# Patient Record
Sex: MALE | Race: WHITE | NOT HISPANIC OR LATINO | URBAN - METROPOLITAN AREA
[De-identification: names, ages, dates, MRNs, and addresses within clinical notes are randomized per-mention and may not be internally consistent; named-entity substitution may affect disease eponyms.]

---

## 2021-04-15 ENCOUNTER — OFFICE (OUTPATIENT)
Dept: URBAN - METROPOLITAN AREA CLINIC 106 | Facility: CLINIC | Age: 76
End: 2021-04-15

## 2021-04-15 VITALS
TEMPERATURE: 97.1 F | HEART RATE: 68 BPM | HEIGHT: 74 IN | WEIGHT: 233 LBS | SYSTOLIC BLOOD PRESSURE: 130 MMHG | DIASTOLIC BLOOD PRESSURE: 84 MMHG

## 2021-04-15 DIAGNOSIS — R10.9 UNSPECIFIED ABDOMINAL PAIN: ICD-10-CM

## 2021-04-15 PROCEDURE — 99214 OFFICE O/P EST MOD 30 MIN: CPT | Performed by: INTERNAL MEDICINE

## 2021-04-15 NOTE — SERVICENOTES
I will get a copy of his blood work and CT scan report from his ER visit to review. In the meantime, I encouraged him to try the Dicyclomine if he has any further episodes of right sided abdominal cramping to see if it helps. For now, I will have him return as needed or sooner if review of his recent ER records warrants follow-up.

## 2021-04-15 NOTE — SERVICEHPINOTES
The patient is seen today for follow-up regarding chronic GERD and a personal history of colon polyps.His most recent surveillance colonoscopy was done on 4/9/19 and was remarkable for two small polyps (tubular adenoma and sessile serrated adenoma), diverticulosis and mild internal hemorrhoids. A repeat surveillance exam was not recommended due to his age.BRAn EGD done at the same time (due to chronic GERD) was remarkable for an irregular Z-line at the GE junction (no barretts on biopsy), small hiatal hernia and gastric polyps (benign fundic gland polyps on biopsy).Today,   he returns (accompanied by his wife) after a recent ER visit - he reports that he was seen in the [facility] ER about 4 weeks ago secondary to right sided abdominal discomfort. Per their report, his evaluation (blood work, CT scan) was unremarkable and he was discharged home with prescriptions for Dicyclomine and Zofran (but has not used the Dicyclomine as of yet).Jd denies any GI tract bleeding symptoms, emesis, unexplained weight loss or changes in his usual bowel habits - he reports that his appetite has been good.

## 2021-08-09 ENCOUNTER — OFFICE (OUTPATIENT)
Dept: URBAN - METROPOLITAN AREA CLINIC 84 | Facility: CLINIC | Age: 76
End: 2021-08-09
Payer: COMMERCIAL

## 2021-08-09 VITALS
HEART RATE: 68 BPM | HEIGHT: 74 IN | WEIGHT: 235 LBS | RESPIRATION RATE: 14 BRPM | DIASTOLIC BLOOD PRESSURE: 92 MMHG | SYSTOLIC BLOOD PRESSURE: 140 MMHG

## 2021-08-09 DIAGNOSIS — R14.0 ABDOMINAL DISTENSION (GASEOUS): ICD-10-CM

## 2021-08-09 DIAGNOSIS — K58.2 MIXED IRRITABLE BOWEL SYNDROME: ICD-10-CM

## 2021-08-09 DIAGNOSIS — R10.9 UNSPECIFIED ABDOMINAL PAIN: ICD-10-CM

## 2021-08-09 PROCEDURE — 99214 OFFICE O/P EST MOD 30 MIN: CPT | Performed by: INTERNAL MEDICINE

## 2021-08-09 RX ORDER — DICYCLOMINE HYDROCHLORIDE 10 MG/1
CAPSULE ORAL
Qty: 30 | Refills: 2 | Status: ACTIVE
Start: 2021-08-09

## 2021-08-09 NOTE — SERVICEHPINOTES
The patient is seen today for follow-up regarding right sided abdominal discomfort, chronic GERD and a personal history of colon polyps.His most recent surveillance colonoscopy was done on 4/9/19 and was remarkable for two small polyps (tubular adenoma and sessile serrated adenoma), diverticulosis and mild internal hemorrhoids. A repeat surveillance exam was not recommended due to his age.brAn EGD done at the same time (due to chronic GERD) was remarkable for an irregular Z-line at the GE junction (no barretts on biopsy), small hiatal hernia and gastric polyps (benign fundic gland polyps on biopsy).His blood work done at the time of a [place] ER visit on 3/23/21 demonstrated a normal wbc count, hgb 16.7, normal liver enzymes and lipase 41. A contrasted CT of the abd/pelvis was only remarkable for a small hepatic cyst and degenerative changes of the spine.At the time of his last office visit in 4/2021, I placed him on a trial of Dicyclomine and today that he continues to have episodes of mid abdominal cramping with associated bloating and irregular bowel habits - he denies any GI tract bleeding symptoms or unexplained weight loss and states that his appetite has been good. He feels that the Bentyl had helped with his abdominal cramping.

## 2021-11-23 ENCOUNTER — OFFICE (OUTPATIENT)
Dept: URBAN - METROPOLITAN AREA CLINIC 67 | Facility: CLINIC | Age: 76
End: 2021-11-23
Payer: COMMERCIAL

## 2021-11-23 VITALS
HEART RATE: 65 BPM | DIASTOLIC BLOOD PRESSURE: 76 MMHG | TEMPERATURE: 96.9 F | WEIGHT: 240 LBS | RESPIRATION RATE: 12 BRPM | HEIGHT: 74 IN | SYSTOLIC BLOOD PRESSURE: 128 MMHG

## 2021-11-23 DIAGNOSIS — R14.0 ABDOMINAL DISTENSION (GASEOUS): ICD-10-CM

## 2021-11-23 DIAGNOSIS — R10.9 UNSPECIFIED ABDOMINAL PAIN: ICD-10-CM

## 2021-11-23 DIAGNOSIS — K21.9 GASTRO-ESOPHAGEAL REFLUX DISEASE WITHOUT ESOPHAGITIS: ICD-10-CM

## 2021-11-23 DIAGNOSIS — E74.31 SUCRASE-ISOMALTASE DEFICIENCY: ICD-10-CM

## 2021-11-23 PROCEDURE — 99214 OFFICE O/P EST MOD 30 MIN: CPT | Performed by: INTERNAL MEDICINE

## 2021-11-23 NOTE — SERVICEHPINOTES
The patient is seen today for follow-up regarding right sided abdominal discomfort, chronic GERD and a personal history of colon polyps.His most recent surveillance colonoscopy was done on 4/9/19 and was remarkable for two small polyps (tubular adenoma and sessile serrated adenoma), diverticulosis and mild internal hemorrhoids. A repeat surveillance exam was not recommended due to his age.brAn EGD done at the same time (due to chronic GERD) was remarkable for an irregular Z-line at the GE junction (no barretts on biopsy), small hiatal hernia and gastric polyps (benign fundic gland polyps on biopsy).Blood work done at the time of a [place] ER visit on 3/23/21 demonstrated a normal wbc count, hgb 16.7, normal liver enzymes and lipase 41. A contrasted CT of the abd/pelvis was only remarkable for a small hepatic cyst and degenerative changes of the spine.He has had some improvement in his symptoms with a trial of Bentyl. brAt the time of his last appointment on 8/9/21, his tTG IgA antibody titer was normal/negative - a fecal elastase was normal but at the lower limit of normal so I decided to put him on a trial of Zenpep 15,000 (2 with meals 1 with snacks) as his symptoms may be related to underlying EPI.brIn addition, a Sucraid BT demonstrated low sucrase activity so I also placed him on a trial of Sucraid. Today, he reports that he stopped the Zenpep as he felt that it didn't help with his symptoms. He has been taking the Sucraid usually twice daily and feels that his symptoms are much improved - he still uses the Bentyl as needed but the frequency is much less.

## 2021-11-23 NOTE — SERVICENOTES
He has been doing very well on the Sucraid - I will have him finish out his current Rx and then have him see how he does without it. 
For now, I will have him return for follow-up in 6 months or sooner if needed.

## 2022-02-15 ENCOUNTER — OFFICE (OUTPATIENT)
Dept: URBAN - METROPOLITAN AREA CLINIC 67 | Facility: CLINIC | Age: 77
End: 2022-02-15
Payer: COMMERCIAL

## 2022-02-15 VITALS
DIASTOLIC BLOOD PRESSURE: 83 MMHG | HEIGHT: 74 IN | WEIGHT: 240 LBS | SYSTOLIC BLOOD PRESSURE: 138 MMHG | HEART RATE: 72 BPM | TEMPERATURE: 97.7 F

## 2022-02-15 DIAGNOSIS — K21.9 GASTRO-ESOPHAGEAL REFLUX DISEASE WITHOUT ESOPHAGITIS: ICD-10-CM

## 2022-02-15 DIAGNOSIS — R14.0 ABDOMINAL DISTENSION (GASEOUS): ICD-10-CM

## 2022-02-15 PROCEDURE — 99214 OFFICE O/P EST MOD 30 MIN: CPT | Performed by: INTERNAL MEDICINE

## 2022-02-15 NOTE — SERVICENOTES
Discussed with him that the bulge in his upper abdomen is consistent with an acquired rectus abdominis diastasis which is not a true hernia and therefore does not necessarily need repair. We discussed that first-line treatment involves exercise and weight loss. 
For now, I will have him return to see me in 6 months or sooner if needed.

## 2022-02-15 NOTE — SERVICEHPINOTES
Alphonso Moon   is seen today for a follow-up visit regarding right sided abdominal discomfort, chronic GERD and a personal history of colon polyps.His most recent surveillance colonoscopy was done on 4/9/19 and was remarkable for two small polyps (tubular adenoma and sessile serrated adenoma), diverticulosis and mild internal hemorrhoids. A repeat surveillance exam was not recommended due to his age.brAn EGD done at the same time (due to chronic GERD) was remarkable for an irregular Z-line at the GE junction (no barretts on biopsy), small hiatal hernia and gastric polyps (benign fundic gland polyps on biopsy).Blood work done at the time of a [place] ER visit on 3/23/21 demonstrated a normal wbc count, hgb 16.7, normal liver enzymes and lipase 41. A contrasted CT of the abd/pelvis was only remarkable for a small hepatic cyst and degenerative changes of the spine.He has had some improvement in his symptoms with a trial of Bentyl.brAt the time of his last appointment on 8/9/21, his tTG IgA antibody titer was normal/negative. brA fecal elastase was normal but at the lower limit of normal - I placed him on a trial of Zenpep 15,000 (2 with meals 1 with snacks) but he felt it did not help with his symptoms. brHowever, a Sucraid BT demonstrated low sucrase activity - he was placed on a trial of Sucraid which has helped with his symptoms. Today, he reports that he continues to take the Sucraid twice daily and really has not had much need to take the Dicyclomine. He recently noticed a bulge in his upper abdomen but without any associated pain, emesis or fever - he was concerned about a possible hernia which is why he comes in today.br

## 2022-07-18 ENCOUNTER — OFFICE (OUTPATIENT)
Dept: URBAN - METROPOLITAN AREA CLINIC 67 | Facility: CLINIC | Age: 77
End: 2022-07-18
Payer: COMMERCIAL

## 2022-07-18 VITALS
WEIGHT: 227 LBS | SYSTOLIC BLOOD PRESSURE: 116 MMHG | DIASTOLIC BLOOD PRESSURE: 78 MMHG | OXYGEN SATURATION: 98 % | HEIGHT: 74 IN | HEART RATE: 55 BPM

## 2022-07-18 DIAGNOSIS — K58.9 IRRITABLE BOWEL SYNDROME WITHOUT DIARRHEA: ICD-10-CM

## 2022-07-18 DIAGNOSIS — K21.9 GASTRO-ESOPHAGEAL REFLUX DISEASE WITHOUT ESOPHAGITIS: ICD-10-CM

## 2022-07-18 DIAGNOSIS — E74.31 SUCRASE-ISOMALTASE DEFICIENCY: ICD-10-CM

## 2022-07-18 PROCEDURE — 99214 OFFICE O/P EST MOD 30 MIN: CPT | Performed by: INTERNAL MEDICINE

## 2022-07-18 NOTE — SERVICEHPINOTES
Alphonso Moon   is seen today for a follow-up visit regarding right sided abdominal discomfort, chronic GERD and a personal history of colon polyps.His most recent surveillance colonoscopy was done on 4/9/19 and was remarkable for two small polyps (tubular adenoma and sessile serrated adenoma), diverticulosis and mild internal hemorrhoids. A repeat surveillance exam was not recommended due to his age.brAn EGD done at the same time (due to chronic GERD) was remarkable for an irregular Z-line at the GE junction (no barretts on biopsy), small hiatal hernia and gastric polyps (benign fundic gland polyps on biopsy).Blood work done at the time of a [place] ER visit on 3/23/21 demonstrated a normal wbc count, hgb 16.7, normal liver enzymes and lipase 41. A contrasted CT of the abd/pelvis was only remarkable for a small hepatic cyst and degenerative changes of the spine.In 8/2021, his tTG IgA antibody titer was normal/negative and a fecal elastase was normal but at the lower limit of normal - he was placed on a trial of Zenpep 15,000 (2 with meals 1 with snacks) but he felt it did not help with his symptoms.brHowever, a Sucraid BT demonstrated low sucrase activity - he was placed on a trial of Sucraid which has helped with his symptoms. He has also had some improvement in his symptoms with a trial of Bentyl.   
br
br  Today, he reports that he stopped the Sucraid about 4 months ago and has been feeling well - he continues to take the Dicylcomine as needed for abdominal cramping but has only taken it twice in the past few months. He has noted a trend towards constipation and has been using OTC Miralax but no GI tract bleeding symptoms. His appetite has been good and he reports that blood work done through Dr. Betancourt's office 3 weeks ago was all normal (those records are forthcoming).

## 2022-07-18 NOTE — SERVICENOTES
He has been doing well off Sucraid and taking his Dicyclomine as needed - he will continue to take OTC Miralax as needed and I will get a copy of the patient's recent blood work done through Dr. Betancourt's office. 
For now, I will have him return to see me as needed.

## 2024-05-29 ENCOUNTER — OFFICE (OUTPATIENT)
Dept: URBAN - METROPOLITAN AREA CLINIC 84 | Facility: CLINIC | Age: 79
End: 2024-05-29
Payer: MEDICARE

## 2024-05-29 VITALS
HEIGHT: 74 IN | WEIGHT: 223 LBS | HEART RATE: 78 BPM | DIASTOLIC BLOOD PRESSURE: 80 MMHG | SYSTOLIC BLOOD PRESSURE: 124 MMHG

## 2024-05-29 DIAGNOSIS — R10.84 GENERALIZED ABDOMINAL PAIN: ICD-10-CM

## 2024-05-29 DIAGNOSIS — K21.9 GASTRO-ESOPHAGEAL REFLUX DISEASE WITHOUT ESOPHAGITIS: ICD-10-CM

## 2024-05-29 DIAGNOSIS — K86.81 EXOCRINE PANCREATIC INSUFFICIENCY: ICD-10-CM

## 2024-05-29 DIAGNOSIS — R19.5 OTHER FECAL ABNORMALITIES: ICD-10-CM

## 2024-05-29 DIAGNOSIS — R19.4 CHANGE IN BOWEL HABIT: ICD-10-CM

## 2024-05-29 PROCEDURE — 99214 OFFICE O/P EST MOD 30 MIN: CPT | Performed by: NURSE PRACTITIONER

## 2024-05-29 RX ORDER — PANTOPRAZOLE 40 MG/1
TABLET, DELAYED RELEASE ORAL
Qty: 30 | Refills: 1 | Status: COMPLETED
Start: 2024-05-29 | End: 2024-06-26

## 2024-06-06 ENCOUNTER — OFFICE (OUTPATIENT)
Dept: URBAN - METROPOLITAN AREA PATHOLOGY 10 | Facility: PATHOLOGY | Age: 79
End: 2024-06-06
Payer: MEDICARE

## 2024-06-06 ENCOUNTER — AMBULATORY SURGICAL CENTER (OUTPATIENT)
Dept: URBAN - METROPOLITAN AREA SURGERY 19 | Facility: SURGERY | Age: 79
End: 2024-06-06
Payer: MEDICARE

## 2024-06-06 DIAGNOSIS — R19.5 OTHER FECAL ABNORMALITIES: ICD-10-CM

## 2024-06-06 DIAGNOSIS — K22.89 OTHER SPECIFIED DISEASE OF ESOPHAGUS: ICD-10-CM

## 2024-06-06 DIAGNOSIS — Z12.11 ENCOUNTER FOR SCREENING FOR MALIGNANT NEOPLASM OF COLON: ICD-10-CM

## 2024-06-06 PROCEDURE — 43239 EGD BIOPSY SINGLE/MULTIPLE: CPT | Mod: 51 | Performed by: INTERNAL MEDICINE

## 2024-06-06 PROCEDURE — 88305 TISSUE EXAM BY PATHOLOGIST: CPT | Performed by: STUDENT IN AN ORGANIZED HEALTH CARE EDUCATION/TRAINING PROGRAM

## 2024-06-06 PROCEDURE — 88341 IMHCHEM/IMCYTCHM EA ADD ANTB: CPT | Performed by: STUDENT IN AN ORGANIZED HEALTH CARE EDUCATION/TRAINING PROGRAM

## 2024-06-06 PROCEDURE — G0121 COLON CA SCRN NOT HI RSK IND: HCPCS | Mod: KX | Performed by: INTERNAL MEDICINE

## 2024-06-06 PROCEDURE — 88342 IMHCHEM/IMCYTCHM 1ST ANTB: CPT | Performed by: STUDENT IN AN ORGANIZED HEALTH CARE EDUCATION/TRAINING PROGRAM

## 2024-06-06 PROCEDURE — 88313 SPECIAL STAINS GROUP 2: CPT | Performed by: STUDENT IN AN ORGANIZED HEALTH CARE EDUCATION/TRAINING PROGRAM

## 2024-06-26 ENCOUNTER — OFFICE (OUTPATIENT)
Dept: URBAN - METROPOLITAN AREA CLINIC 84 | Facility: CLINIC | Age: 79
End: 2024-06-26
Payer: MEDICARE

## 2024-06-26 VITALS
SYSTOLIC BLOOD PRESSURE: 130 MMHG | OXYGEN SATURATION: 98 % | HEART RATE: 64 BPM | DIASTOLIC BLOOD PRESSURE: 80 MMHG | WEIGHT: 222 LBS | HEIGHT: 74 IN

## 2024-06-26 DIAGNOSIS — K58.9 IRRITABLE BOWEL SYNDROME WITHOUT DIARRHEA: ICD-10-CM

## 2024-06-26 DIAGNOSIS — K44.9 DIAPHRAGMATIC HERNIA WITHOUT OBSTRUCTION OR GANGRENE: ICD-10-CM

## 2024-06-26 DIAGNOSIS — K57.30 DIVERTICULOSIS OF LARGE INTESTINE WITHOUT PERFORATION OR ABS: ICD-10-CM

## 2024-06-26 PROCEDURE — 99214 OFFICE O/P EST MOD 30 MIN: CPT | Performed by: INTERNAL MEDICINE

## 2024-06-26 RX ORDER — DICYCLOMINE HYDROCHLORIDE 10 MG/1
CAPSULE ORAL
Qty: 40 | Refills: 3 | Status: ACTIVE
Start: 2024-06-26

## 2024-06-26 NOTE — SERVICEHPINOTES
Alphonso Moon   is seen today for a follow-up visit for follow-up regarding suspected IBS.In the interim since his last visit, he underwent an EGD + colonoscopy on 6/6/24 secondary to recent coffee-ground emesis and hemoccult positive stool (in the setting of recent heavy alcohol intake).[facility] EGD was remarkable for an irregular Z-line at the GE junction, a small hiatal hernia and gastric polyps. Biopsies of the GE junction were negative for barretts. [facility]  [facility] for sigmoid diverticulosis and mild internal hemorrhoids.
br
br    Today, he reports that he has been using Dicyclomine as needed as well as Zenpep but he ran out of both and has been using his wife's supply - he was found to have a low normal fecal elastase (201) in 8/2021 and was placed on empiric pancreatic enzyme supplement. However, he has noticed more issues with constipation with needing to strain to have a BM.

## 2024-06-26 NOTE — SERVICENOTES
I will have him stop the Zenpep given his constipation and will refill his Dicyclomine - I will have him return for follow-up in 1 year or sooner if needed.

## 2025-08-04 ENCOUNTER — OFFICE (OUTPATIENT)
Dept: URBAN - METROPOLITAN AREA CLINIC 67 | Facility: CLINIC | Age: 80
End: 2025-08-04
Payer: MEDICARE

## 2025-08-04 VITALS
DIASTOLIC BLOOD PRESSURE: 72 MMHG | HEIGHT: 74 IN | SYSTOLIC BLOOD PRESSURE: 122 MMHG | HEART RATE: 61 BPM | OXYGEN SATURATION: 98 % | WEIGHT: 225 LBS

## 2025-08-04 DIAGNOSIS — K86.81 EXOCRINE PANCREATIC INSUFFICIENCY: ICD-10-CM

## 2025-08-04 PROCEDURE — 99214 OFFICE O/P EST MOD 30 MIN: CPT | Performed by: INTERNAL MEDICINE

## 2025-08-04 NOTE — SERVICEHPINOTES
Alphonso Moon   is seen today for a follow-up visit regarding EPI.He underwent an EGD + colonoscopy on 6/6/24 secondary to recent coffee-ground emesis and hemoccult positive stool (in the setting of recent heavy alcohol intake).brHis EGD was remarkable for an irregular Z-line at the GE junction, a small hiatal hernia and gastric polyps. Biopsies of the GE junction were negative for barretts.brHis colonoscopy to the  was remarkable for sigmoid diverticulosis and mild internal hemorrhoids.Blood work done in 11/2024 demonstrated a normal CBC, CMP, TSH and tTG IgA Ab titer - a food allergy panel was only remarkable for a low/equivocal reaction to milk.At the time of his last appointment in 5/2025, he reported experiencing more frequent bouts of abdominal cramping (mid and lower abdomen) with associated irregular bowel habits.brBlood work at that time demonstrated a normal CBC, liver enzymes and CRP.brHowever, his fecal elastase was quite low at 14, consistent with EPI. In 11/2024, his tTG IgA Ab titer was normal/negative and a food allergy panel was only remarkable for a low/equivocal reaction to milk.He was started on a trial of a pancreatic enzyme supplement and a contrasted CT of the abd/pelvis done on 6/6/25 was remarkable for diminished pancreatic volume and a 2.3cm hepatic cyst. Today, he is happy to report that since taking the Zenpep, he has not had any further bouts of abdominal cramping and his bowel habits have been more consistent/less irregular (also taking an OTC fiber supplement). He has been taking 4 tablets with meals and usually 2 with snacks.

## 2025-08-04 NOTE — SERVICENOTES
He has had a very good response to Zenpep and I will keep him on his current regimen - for now, I will have him return for follow-up in 6 months or sooner if needed.